# Patient Record
Sex: FEMALE | Race: WHITE | NOT HISPANIC OR LATINO
[De-identification: names, ages, dates, MRNs, and addresses within clinical notes are randomized per-mention and may not be internally consistent; named-entity substitution may affect disease eponyms.]

---

## 2022-11-11 PROBLEM — Z00.00 ENCOUNTER FOR PREVENTIVE HEALTH EXAMINATION: Status: ACTIVE | Noted: 2022-11-11

## 2023-05-12 LAB — TSH SERPL-ACNC: 2.46

## 2023-05-15 ENCOUNTER — APPOINTMENT (OUTPATIENT)
Dept: ENDOCRINOLOGY | Facility: CLINIC | Age: 28
End: 2023-05-15
Payer: COMMERCIAL

## 2023-05-15 VITALS
WEIGHT: 185 LBS | DIASTOLIC BLOOD PRESSURE: 70 MMHG | SYSTOLIC BLOOD PRESSURE: 110 MMHG | HEART RATE: 92 BPM | HEIGHT: 61 IN | OXYGEN SATURATION: 97 % | BODY MASS INDEX: 34.93 KG/M2

## 2023-05-15 DIAGNOSIS — E06.3 AUTOIMMUNE THYROIDITIS: ICD-10-CM

## 2023-05-15 DIAGNOSIS — D68.2 HEREDITARY DEFICIENCY OF OTHER CLOTTING FACTORS: ICD-10-CM

## 2023-05-15 DIAGNOSIS — R42 DIZZINESS AND GIDDINESS: ICD-10-CM

## 2023-05-15 DIAGNOSIS — R56.9 UNSPECIFIED CONVULSIONS: ICD-10-CM

## 2023-05-15 PROCEDURE — 99204 OFFICE O/P NEW MOD 45 MIN: CPT

## 2023-06-13 PROBLEM — D68.2 FACTOR II DEFICIENCY: Status: ACTIVE | Noted: 2023-06-13

## 2023-06-13 RX ORDER — ASPIRIN 81 MG
81 TABLET, DELAYED RELEASE (ENTERIC COATED) ORAL
Refills: 0 | Status: ACTIVE | COMMUNITY

## 2023-06-13 RX ORDER — UBIDECARENONE 50 MG
TABLET ORAL
Refills: 0 | Status: ACTIVE | COMMUNITY

## 2023-06-13 RX ORDER — TOPIRAMATE 50 MG/1
TABLET, FILM COATED ORAL
Refills: 0 | Status: ACTIVE | COMMUNITY

## 2023-06-13 NOTE — ASSESSMENT
[FreeTextEntry1] : HT  not requiring meds yet\par \par pt asing if this could be HT encephalopathy causing seizures  could onlydx whtht with LP would have ot be doen by NEurology - not me\par \par Pt has  other issuesas well- drainiage from ear-- to see NEuro-iglesia next week\par

## 2023-06-13 NOTE — REVIEW OF SYSTEMS
[Fatigue] : fatigue [Decreased Appetite] : decreased appetite [Neck Pain] : neck pain [Nausea] : nausea [Constipation] : constipation [Diarrhea] : diarrhea [Joint Pain] : joint pain [Muscle Weakness] : muscle weakness [Myalgia] : myalgia  [Joint Stiffness] : joint stiffness [Muscle Cramps] : muscle cramps [Back Pain] : back pain [Headaches] : headaches [Dizziness] : dizziness [Confusion] : confusion [Tremors] : tremors [Pain/Numbness of Digits] : pain/numbness of digits [Poor Balance] : poor balance [Depression] : depression [Insomnia] : insomnia [Anxiety] : anxiety [Stress] : stress [Cold Intolerance] : cold intolerance [Negative] : Heme/Lymph [FreeTextEntry5] : occ palp

## 2023-06-13 NOTE — HISTORY OF PRESENT ILLNESS
[FreeTextEntry1] : Pt here for veval of HT \par pt found to have HT 10/22 notrequiring meds \par found on w/u by rheumatology \par \par PMH \par \par FActor 2 def- seeing heamotlogy \par asthma\par \par   Seizure d/o  dx 7/2022   triggered by light and sounds\par \par pt has been found to have fluid leaking fromear  but no cause ofund\par \par tsaw ENT - \par \par saw HA specialist \par \par to see [facility]olist at [facility]  next week \par \par Galucoma \par \par PSH \par  none\par \par \par \par FH \par  Mother Father Sister GF  - obesity\par \par sister and GM  thrytoid trouble \par \par \par SocHX \par born in   raised in [place]\par \par  No exposure to chemor XRT \par \par  no expsoure to chem or XRT \par  no alcohol \par  nosmoking \par

## 2023-06-13 NOTE — PHYSICAL EXAM
[Alert] : alert [Well Nourished] : well nourished [No Acute Distress] : no acute distress [Well Developed] : well developed [Normal Sclera/Conjunctiva] : normal sclera/conjunctiva [EOMI] : extra ocular movement intact [No Proptosis] : no proptosis [Normal Oropharynx] : the oropharynx was normal [Thyroid Not Enlarged] : the thyroid was not enlarged [No Thyroid Nodules] : no palpable thyroid nodules [No Respiratory Distress] : no respiratory distress [Clear to Auscultation] : lungs were clear to auscultation bilaterally [No Accessory Muscle Use] : no accessory muscle use [Normal S1, S2] : normal S1 and S2 [Normal Rate] : heart rate was normal [Regular Rhythm] : with a regular rhythm [No Edema] : no peripheral edema [Pedal Pulses Normal] : the pedal pulses are present [Normal Anterior Cervical Nodes] : no anterior cervical lymphadenopathy [Normal Posterior Cervical Nodes] : no posterior cervical lymphadenopathy [No Stigmata of Cushings Syndrome] : no stigmata of Cushings Syndrome [Normal Gait] : normal gait [Normal Strength/Tone] : muscle strength and tone were normal [No Rash] : no rash [Normal Reflexes] : deep tendon reflexes were 2+ and symmetric [No Tremors] : no tremors [Oriented x3] : oriented to person, place, and time [Acanthosis Nigricans] : no acanthosis nigricans

## 2023-11-22 ENCOUNTER — RX RENEWAL (OUTPATIENT)
Age: 28
End: 2023-11-22

## 2023-11-30 ENCOUNTER — APPOINTMENT (OUTPATIENT)
Dept: ENDOCRINOLOGY | Facility: CLINIC | Age: 28
End: 2023-11-30
Payer: COMMERCIAL

## 2023-11-30 LAB — TSH SERPL-ACNC: 2.24

## 2023-11-30 PROCEDURE — 99213 OFFICE O/P EST LOW 20 MIN: CPT | Mod: 95

## 2023-11-30 RX ORDER — LEVOTHYROXINE SODIUM 0.03 MG/1
25 TABLET ORAL
Qty: 45 | Refills: 0 | Status: DISCONTINUED | COMMUNITY
Start: 2023-08-25 | End: 2023-11-30

## 2023-11-30 RX ORDER — LEVETIRACETAM 1000 MG/1
TABLET, FILM COATED ORAL
Refills: 0 | Status: DISCONTINUED | COMMUNITY
End: 2023-11-30

## 2023-12-26 NOTE — HISTORY OF PRESENT ILLNESS
[FreeTextEntry1] : TEB start ttime 410 PM end time 430 PM  Follow up HT  pt found to have HT 10/22 notrequiring meds  found on w/u by rheumatology   PMH    follo wup HT  FActor 2 def- seeing heamotlogy  asthma    Seizure d/o  dx 7/2022   triggered by light and sounds   pt found still with fluid leaking o from ear   tsaw ENT -   saw HA specialist   to see Neuro[facility]otolaryngolist at [facility]  next week   Galucoma   PSH   none    FH   Mother Father Sister GF  - obesity  sister and GM  thrytoid trouble    SocHX  born in   raised in [place]   No exposure to chemor XRT    no expsoure to chem or XRT   no alcohol   nosmoking

## 2023-12-26 NOTE — REASON FOR VISIT
[Home] : at home, [unfilled] , at the time of the visit. [Medical Office: ([facility])___] : at the medical office located in  [This encounter was initiated by telehealth (audio with video) and converted to telephone (audio only) due to technical difficulties.] : This encounter was initiated by telehealth (audio with video) and converted to telephone (audio only) due to technical difficulties. [Follow - Up] : a follow-up visit

## 2024-01-23 ENCOUNTER — TRANSCRIPTION ENCOUNTER (OUTPATIENT)
Age: 29
End: 2024-01-23

## 2024-04-11 ENCOUNTER — RX ONLY (RX ONLY)
Age: 29
End: 2024-04-11

## 2024-04-11 ENCOUNTER — OFFICE (OUTPATIENT)
Dept: URBAN - METROPOLITAN AREA CLINIC 38 | Facility: CLINIC | Age: 29
Setting detail: OPHTHALMOLOGY
End: 2024-04-11
Payer: MEDICAID

## 2024-04-11 DIAGNOSIS — H50.00: ICD-10-CM

## 2024-04-11 DIAGNOSIS — H40.1130: ICD-10-CM

## 2024-04-11 PROCEDURE — 92004 COMPRE OPH EXAM NEW PT 1/>: CPT | Performed by: OPHTHALMOLOGY

## 2024-04-11 PROCEDURE — 92015 DETERMINE REFRACTIVE STATE: CPT | Performed by: OPHTHALMOLOGY

## 2024-04-11 PROCEDURE — 92133 CPTRZD OPH DX IMG PST SGM ON: CPT | Performed by: OPHTHALMOLOGY

## 2024-06-13 ENCOUNTER — NON-APPOINTMENT (OUTPATIENT)
Age: 29
End: 2024-06-13

## 2024-06-14 ENCOUNTER — APPOINTMENT (OUTPATIENT)
Dept: OBGYN | Facility: CLINIC | Age: 29
End: 2024-06-14
Payer: MEDICAID

## 2024-06-14 VITALS
HEIGHT: 61 IN | SYSTOLIC BLOOD PRESSURE: 116 MMHG | DIASTOLIC BLOOD PRESSURE: 72 MMHG | WEIGHT: 168 LBS | BODY MASS INDEX: 31.72 KG/M2

## 2024-06-14 DIAGNOSIS — Z11.3 ENCOUNTER FOR SCREENING FOR INFECTIONS WITH A PREDOMINANTLY SEXUAL MODE OF TRANSMISSION: ICD-10-CM

## 2024-06-14 DIAGNOSIS — Z78.9 OTHER SPECIFIED HEALTH STATUS: ICD-10-CM

## 2024-06-14 DIAGNOSIS — N92.6 IRREGULAR MENSTRUATION, UNSPECIFIED: ICD-10-CM

## 2024-06-14 DIAGNOSIS — Z83.3 FAMILY HISTORY OF DIABETES MELLITUS: ICD-10-CM

## 2024-06-14 DIAGNOSIS — Q21.12 PATENT FORAMEN OVALE: ICD-10-CM

## 2024-06-14 DIAGNOSIS — Z12.4 ENCOUNTER FOR SCREENING FOR MALIGNANT NEOPLASM OF CERVIX: ICD-10-CM

## 2024-06-14 DIAGNOSIS — R19.00 INTRA-ABDOMINAL AND PELVIC SWELLING, MASS AND LUMP, UNSPECIFIED SITE: ICD-10-CM

## 2024-06-14 DIAGNOSIS — R79.89 OTHER SPECIFIED ABNORMAL FINDINGS OF BLOOD CHEMISTRY: ICD-10-CM

## 2024-06-14 DIAGNOSIS — Z72.3 LACK OF PHYSICAL EXERCISE: ICD-10-CM

## 2024-06-14 DIAGNOSIS — Z01.411 ENCOUNTER FOR GYNECOLOGICAL EXAMINATION (GENERAL) (ROUTINE) WITH ABNORMAL FINDINGS: ICD-10-CM

## 2024-06-14 LAB
HCG UR QL: NEGATIVE
QUALITY CONTROL: YES

## 2024-06-14 PROCEDURE — 99459 PELVIC EXAMINATION: CPT

## 2024-06-14 PROCEDURE — 99385 PREV VISIT NEW AGE 18-39: CPT

## 2024-06-14 PROCEDURE — 81025 URINE PREGNANCY TEST: CPT

## 2024-06-14 PROCEDURE — 99204 OFFICE O/P NEW MOD 45 MIN: CPT | Mod: 25

## 2024-06-14 RX ORDER — PNV NO.95/FERROUS FUM/FOLIC AC 28MG-0.8MG
TABLET ORAL
Refills: 0 | Status: ACTIVE | COMMUNITY

## 2024-06-14 RX ORDER — OMEGA-3/DHA/EPA/FISH OIL 300-1000MG
CAPSULE ORAL
Refills: 0 | Status: ACTIVE | COMMUNITY

## 2024-06-14 RX ORDER — RIMEGEPANT SULFATE 75 MG/75MG
75 TABLET, ORALLY DISINTEGRATING ORAL
Refills: 0 | Status: ACTIVE | COMMUNITY

## 2024-06-14 RX ORDER — BUDESONIDE 90 UG/1
90 AEROSOL, POWDER RESPIRATORY (INHALATION)
Refills: 0 | Status: ACTIVE | COMMUNITY

## 2024-06-14 RX ORDER — ELECTROLYTES/DEXTROSE
SOLUTION, ORAL ORAL
Refills: 0 | Status: ACTIVE | COMMUNITY

## 2024-06-14 RX ORDER — EPINEPHRINE 0.3 MG/.3ML
0.3 INJECTION INTRAMUSCULAR
Refills: 0 | Status: ACTIVE | COMMUNITY

## 2024-06-14 RX ORDER — CHROMIUM 200 MCG
TABLET ORAL
Refills: 0 | Status: ACTIVE | COMMUNITY

## 2024-06-14 RX ORDER — OMEPRAZOLE 20 MG/1
20 CAPSULE, DELAYED RELEASE ORAL
Refills: 0 | Status: ACTIVE | COMMUNITY

## 2024-06-14 RX ORDER — VENLAFAXINE HCL 37.5 MG
37.5 TABLET ORAL
Refills: 0 | Status: ACTIVE | COMMUNITY

## 2024-06-14 RX ORDER — MECLIZINE HYDROCHLORIDE 12.5 MG/1
12.5 TABLET ORAL
Refills: 0 | Status: ACTIVE | COMMUNITY

## 2024-06-14 RX ORDER — LORATADINE 10 MG/1
10 TABLET ORAL
Refills: 0 | Status: ACTIVE | COMMUNITY

## 2024-06-14 RX ORDER — ALBUTEROL SULFATE 90 UG/1
108 (90 BASE) INHALANT RESPIRATORY (INHALATION)
Refills: 0 | Status: ACTIVE | COMMUNITY

## 2024-06-14 RX ORDER — LATANOPROST/PF 0.005 %
0.01 DROPS OPHTHALMIC (EYE)
Refills: 0 | Status: ACTIVE | COMMUNITY

## 2024-06-14 RX ORDER — ASCORBIC ACID 500 MG
TABLET ORAL
Refills: 0 | Status: ACTIVE | COMMUNITY

## 2024-06-14 NOTE — HISTORY OF PRESENT ILLNESS
[No] : Patient does not have concerns regarding sex [N] : Patient denies prior pregnancies [Menarche Age: ____] : age at menarche was [unfilled] [Never active] : never active [TextBox_4] : Muriel is here to establish care. She was recommended to see gyn due to incidentally noted elevated hcg levels at a recent hospital visit.  She presented to the ER on 4/17 for left arm and chest pain. She states her troponins were elevated and she was admitted for observation. During that visit she had labs completed and hcg was noted to be 25, dropping to 23.  (records not available, this is pt's recollectoin).  Pt states she has never been sexually active and pregnancy is not possible.  She has not had levels rechecked.  She states a doctor in the hospital told her to be checked for gyn cancer.  She has a relatively new history of seizures- within the last couple of years.  She states she has the seizures 'when she is sick'.  Since 4/17 she had very frequent seizures and felt something was wrong. She went to the ER 3 more times, and was finally told she needed her gallbladder removed. She had the cholecystectomy on 5/3.  She states her seizures stopped after this.    She also has PMH significant for PFO, Factor 2 (takes asa 81 daily), asthma, glaucoma, and migraines.  From a gyn standpoint- she has regular monthly menses, with recent menses being lighter than usual/sometimes brown spotting only.  She sees a correllation with bilateral axillary discomfort and ovulation.  She has never been sexually active, but states she would be comfortable with a pelvic exam as she has inserted something in the vagina before.  There are no labs or imaging results available, but pt states she had an abdominal CT on 4/17 and was told she had a '1.4cm right ovarian cyst'. She has never had a pelvic US.          [PapSmeardate] : NEVER [ColonoscopyDate] : NEVER [LMPDate] : 05/30/24 [PGHxTotal] : 0 [Regular Cycle Intervals] : periods have been regular [FreeTextEntry1] : 05/30/24

## 2024-06-14 NOTE — PLAN
[FreeTextEntry1] : We discussed her concerns regarding the elevated hcg level and irregular periods.  We will repeat lab work today and a pelvic US was ordered.  If her hcg is elevated we will repeat the level next week.  We discussed referring to gyn onc for an opinion if hcg is elevated to discuss further work up.    She will f/u with me same day as the US to review results.    She tolerated the pelvic exam well.  Self breast exams/awareness encouraged

## 2024-06-14 NOTE — PHYSICAL EXAM
[Chaperone Present] : A chaperone was present in the examining room during all aspects of the physical examination [[id number]] : A chaperone was present during the pelvic exam. [FreeTextEntry2] : ROSMERY Malhotra [Appropriately responsive] : appropriately responsive [Alert] : alert [No Acute Distress] : no acute distress [Soft] : soft [Non-tender] : non-tender [Non-distended] : non-distended [No HSM] : No HSM [No Lesions] : no lesions [No Mass] : no mass [Oriented x3] : oriented x3 [Examination Of The Breasts] : a normal appearance [No Masses] : no breast masses were palpable [Labia Majora] : normal [Labia Minora] : normal [Normal] : normal [Tenderness] : nontender [FreeTextEntry8] : generalized pelvic fullness- may be related to pt being tense.  ovaries not palpable. uterus enlarged??

## 2024-06-19 ENCOUNTER — NON-APPOINTMENT (OUTPATIENT)
Age: 29
End: 2024-06-19

## 2024-06-19 LAB
BASOPHILS # BLD AUTO: 0.09 K/UL
BASOPHILS NFR BLD AUTO: 0.9 %
C TRACH RRNA SPEC QL NAA+PROBE: NOT DETECTED
CYTOLOGY CVX/VAG DOC THIN PREP: NORMAL
EOSINOPHIL # BLD AUTO: 0.31 K/UL
EOSINOPHIL NFR BLD AUTO: 3.1 %
ESTRADIOL SERPL-MCNC: 92 PG/ML
FSH SERPL-MCNC: 4.9 IU/L
HCG SERPL-MCNC: <1 MIU/ML
HCT VFR BLD CALC: 40.8 %
HGB BLD-MCNC: 13.6 G/DL
HPV HIGH+LOW RISK DNA PNL CVX: NOT DETECTED
IMM GRANULOCYTES NFR BLD AUTO: 0.4 %
LH SERPL-ACNC: 6.5 IU/L
LYMPHOCYTES # BLD AUTO: 2.65 K/UL
LYMPHOCYTES NFR BLD AUTO: 26.7 %
MAN DIFF?: NORMAL
MCHC RBC-ENTMCNC: 32.5 PG
MCHC RBC-ENTMCNC: 33.3 GM/DL
MCV RBC AUTO: 97.4 FL
MONOCYTES # BLD AUTO: 0.63 K/UL
MONOCYTES NFR BLD AUTO: 6.3 %
N GONORRHOEA RRNA SPEC QL NAA+PROBE: NOT DETECTED
NEUTROPHILS # BLD AUTO: 6.22 K/UL
NEUTROPHILS NFR BLD AUTO: 62.6 %
PLATELET # BLD AUTO: 344 K/UL
PROLACTIN SERPL-MCNC: 13.1 NG/ML
RBC # BLD: 4.19 M/UL
RBC # FLD: 13.4 %
SOURCE TP AMPLIFICATION: NORMAL
TSH SERPL-ACNC: 6.6 UIU/ML
WBC # FLD AUTO: 9.94 K/UL

## 2024-08-05 ENCOUNTER — APPOINTMENT (OUTPATIENT)
Dept: ANTEPARTUM | Facility: CLINIC | Age: 29
End: 2024-08-05

## 2024-08-05 ENCOUNTER — APPOINTMENT (OUTPATIENT)
Dept: OBGYN | Facility: CLINIC | Age: 29
End: 2024-08-05

## 2024-08-05 ENCOUNTER — ASOB RESULT (OUTPATIENT)
Age: 29
End: 2024-08-05

## 2024-08-05 PROBLEM — R79.89 ELEVATED TSH: Status: ACTIVE | Noted: 2024-08-05

## 2024-08-05 PROBLEM — Z71.2 ENCOUNTER TO DISCUSS TEST RESULTS: Status: ACTIVE | Noted: 2024-08-05

## 2024-08-05 PROCEDURE — 76857 US EXAM PELVIC LIMITED: CPT | Mod: 59

## 2024-08-05 PROCEDURE — 99213 OFFICE O/P EST LOW 20 MIN: CPT

## 2024-08-05 PROCEDURE — 76830 TRANSVAGINAL US NON-OB: CPT

## 2024-08-05 NOTE — PLAN
[FreeTextEntry1] : normal pelvic US  will check thyroid panel today, and pt requesting we send to her pcp Dr. Trimble in [place] who she is scheduled with next week.  keep planned f/u with endocrinology.

## 2024-08-05 NOTE — HISTORY OF PRESENT ILLNESS
[N] : Patient denies prior pregnancies [Menarche Age: ____] : age at menarche was [unfilled] [No] : Patient does not have concerns regarding sex [Never active] : never active [TextBox_4] : Muriel is here for follow up. she had a pelvic US completed due to history of an ovarian cyst. She also had elevated hcg levels despite never being sexually active (incidentally noted while in the hospital). Her hcg is now negative.  Her pelvic US is normal today.  Her TSH was noted to be elevated- she had an endocrinology appointment scheduled for october, but is seeing her PCP next week.   [PapSmeardate] : 06/14/2024 [TextBox_31] : NEGATIVE [ColonoscopyDate] : NEVER [HPVDate] : 06/14/2024 [TextBox_78] : NEGATIVE [LMPDate] : 07/20/24 [PGHxTotal] : 0 [FreeTextEntry1] : 07/20/24

## 2024-10-24 ENCOUNTER — APPOINTMENT (OUTPATIENT)
Dept: ENDOCRINOLOGY | Facility: CLINIC | Age: 29
End: 2024-10-24
Payer: MEDICAID

## 2024-10-24 VITALS
HEIGHT: 61 IN | BODY MASS INDEX: 31.72 KG/M2 | HEART RATE: 57 BPM | WEIGHT: 168 LBS | SYSTOLIC BLOOD PRESSURE: 118 MMHG | OXYGEN SATURATION: 95 % | DIASTOLIC BLOOD PRESSURE: 64 MMHG

## 2024-10-24 DIAGNOSIS — E06.3 AUTOIMMUNE THYROIDITIS: ICD-10-CM

## 2024-10-24 LAB — TSH SERPL-ACNC: 1.99

## 2024-10-24 PROCEDURE — G2211 COMPLEX E/M VISIT ADD ON: CPT | Mod: NC

## 2024-10-24 PROCEDURE — 99214 OFFICE O/P EST MOD 30 MIN: CPT

## 2024-10-24 NOTE — PHYSICAL EXAM
Initial (On Arrival) [Alert] : alert [Obese] : obese [No Acute Distress] : no acute distress [Normal Voice/Communication] : normal voice communication [PERRL] : pupils equal, round and reactive to light [Normal Hearing] : hearing was normal [No Neck Mass] : no neck mass was observed [Thyroid Not Enlarged] : the thyroid was not enlarged [No Respiratory Distress] : no respiratory distress [Clear to Auscultation] : lungs were clear to auscultation bilaterally [Normal Rate] : heart rate was normal [Regular Rhythm] : with a regular rhythm [Oriented x3] : oriented to person, place, and time [Normal Affect] : the affect was normal [Normal Insight/Judgement] : insight and judgment were intact [Normal Mood] : the mood was normal [de-identified] : Light DTR check at right antecubital area provoked ballistic arm movements.

## 2024-10-24 NOTE — HISTORY OF PRESENT ILLNESS
[FreeTextEntry1] : 29 y.o. Female follows for Hashimoto's thyroiditis. Transfers care from Dr. Valdes. Patient has been having movement disorder with unclear diagnosis over the last 2 year. She has been following with different subspecialty of neurology. She was on Naltrexone holding her involuntary movements but recently stopped due to palpitations, which exacerbated her condition.

## 2024-10-24 NOTE — PHYSICAL EXAM
[Alert] : alert [Obese] : obese [No Acute Distress] : no acute distress [Normal Voice/Communication] : normal voice communication [PERRL] : pupils equal, round and reactive to light [Normal Hearing] : hearing was normal [No Neck Mass] : no neck mass was observed [Thyroid Not Enlarged] : the thyroid was not enlarged [No Respiratory Distress] : no respiratory distress [Clear to Auscultation] : lungs were clear to auscultation bilaterally [Normal Rate] : heart rate was normal [Regular Rhythm] : with a regular rhythm [Oriented x3] : oriented to person, place, and time [Normal Affect] : the affect was normal [Normal Insight/Judgement] : insight and judgment were intact [Normal Mood] : the mood was normal [de-identified] : Light DTR check at right antecubital area provoked ballistic arm movements.

## 2024-10-24 NOTE — ASSESSMENT
[FreeTextEntry1] : 29 y.o. Female follows for Hashimoto's thyroiditis. Transfers care from Dr. Valdes. Patient has been having movement disorder with unclear diagnosis over the last 2 year. She has been following with different subspecialty of neurology. She was on Naltrexone holding her involuntary movements but recently stopped due to palpitations, which exacerbated her condition.   # Reportedly Hashimoto's disease Not on medications TFTs have been WNL Clinically and biochemically euthyroid F/u in 6 months.

## 2024-11-15 ENCOUNTER — APPOINTMENT (OUTPATIENT)
Dept: RHEUMATOLOGY | Facility: CLINIC | Age: 29
End: 2024-11-15

## 2024-11-15 VITALS
WEIGHT: 168 LBS | HEART RATE: 109 BPM | BODY MASS INDEX: 31.72 KG/M2 | HEIGHT: 61 IN | DIASTOLIC BLOOD PRESSURE: 77 MMHG | SYSTOLIC BLOOD PRESSURE: 116 MMHG | OXYGEN SATURATION: 100 %

## 2024-11-15 DIAGNOSIS — H81.09 MENIERE'S DISEASE, UNSPECIFIED EAR: ICD-10-CM

## 2024-11-15 DIAGNOSIS — G25.9 EXTRAPYRAMIDAL AND MOVEMENT DISORDER, UNSPECIFIED: ICD-10-CM

## 2024-11-15 DIAGNOSIS — Q79.62 HYPERMOBILE EHLERS-DANLOS SYNDROME: ICD-10-CM

## 2024-11-15 DIAGNOSIS — D68.2 HEREDITARY DEFICIENCY OF OTHER CLOTTING FACTORS: ICD-10-CM

## 2024-11-15 DIAGNOSIS — F44.5 CONVERSION DISORDER WITH SEIZURES OR CONVULSIONS: ICD-10-CM

## 2024-11-15 PROCEDURE — 99205 OFFICE O/P NEW HI 60 MIN: CPT

## 2024-11-15 PROCEDURE — 99417 PROLNG OP E/M EACH 15 MIN: CPT

## 2024-11-16 LAB
CD16+CD56+ CELLS # BLD: 245 CELLS/UL
CD16+CD56+ CELLS NFR BLD: 12 %
CD19 CELLS NFR BLD: 229 CELLS/UL
CD3 CELLS # BLD: 1502 CELLS/UL
CD3 CELLS NFR BLD: 74 %
CD3+CD4+ CELLS # BLD: 884 CELLS/UL
CD3+CD4+ CELLS NFR BLD: 43 %
CD3+CD4+ CELLS/CD3+CD8+ CLL SPEC: 1.68 RATIO
CD3+CD8+ CELLS # SPEC: 526 CELLS/UL
CD3+CD8+ CELLS NFR BLD: 26 %
CELLS.CD3-CD19+/CELLS IN BLOOD: 11 %

## 2024-11-18 LAB
CD19 CELLS # BLD: 275 CELLS/UL
CD19 CELLS NFR BLD: 12.9
CD19+CD27+ CELLS # BLD: 126 CELLS/UL
CD19+CD27+IGD+IGM+ CELLS # BLD: 58 CELLS/UL
CD19+CD27+IGD-IGM- CELLS # BLD: 50 CELLS/UL
CD19+CD38+IGM+ CELLS # BLD: 6 CELLS/UL
CD19+CD38+IGM- CELLS # BLD: 1 CELLS/UL
CD20 CELLS # BLD: 271 CELLS/UL
CD20 CELLS/CELLS.CD19 NFR BLD: 98.4
CD21LOW38- CELLS # BLD: 14 CELLS/UL
CD21LOW38- CELLS/CELLS.CD19 NFR BLD: 5.2
CD27 CELLS/CELLS.CD19 NFR BLD: 45.9
CD27+IGD+IGM+/CD19 CELLS NFR BLD: 20.9
CD27+IGD-IGM-/CD19 CELLS NFR BLD: 18.1
CD38+IGM+ CELLS/CELLS.CD19 NFR BLD: 2.1
CD38+IGM- CELLS/CELLS.CD19 NFR BLD: 0.5
HISTAMINE BLD-MCNC: 1.59 NG/ML
TOTAL IGE SMQN RAST: 18 KU/L
TRYPTASE: 10.1 UG/L

## 2024-11-19 LAB
ALBUMIN MFR SERPL ELPH: 54.9 %
ALBUMIN SERPL-MCNC: 4.1 G/DL
ALBUMIN/GLOB SERPL: 1.2 RATIO
ALPHA1 GLOB MFR SERPL ELPH: 5.4 %
ALPHA1 GLOB SERPL ELPH-MCNC: 0.4 G/DL
ALPHA2 GLOB MFR SERPL ELPH: 10.1 %
ALPHA2 GLOB SERPL ELPH-MCNC: 0.8 G/DL
B-GLOBULIN MFR SERPL ELPH: 8.1 %
B-GLOBULIN SERPL ELPH-MCNC: 0.6 G/DL
DEPRECATED KAPPA LC FREE/LAMBDA SER: 1.35 RATIO
GAMMA GLOB FLD ELPH-MCNC: 1.6 G/DL
GAMMA GLOB MFR SERPL ELPH: 21.5 %
IGA SER QL IEP: <2 MG/DL
IGG SER QL IEP: 1681 MG/DL
IGM SER QL IEP: 132 MG/DL
INTERPRETATION SERPL IEP-IMP: NORMAL
KAPPA LC CSF-MCNC: 1.51 MG/DL
KAPPA LC SERPL-MCNC: 2.04 MG/DL
M PROTEIN SPEC IFE-MCNC: NORMAL
PROT SERPL-MCNC: 7.5 G/DL
PROT SERPL-MCNC: 7.5 G/DL

## 2024-11-23 ENCOUNTER — OFFICE (OUTPATIENT)
Dept: URBAN - METROPOLITAN AREA CLINIC 38 | Facility: CLINIC | Age: 29
Setting detail: OPHTHALMOLOGY
End: 2024-11-23
Payer: COMMERCIAL

## 2024-11-23 DIAGNOSIS — H40.1130: ICD-10-CM

## 2024-11-23 PROCEDURE — 76514 ECHO EXAM OF EYE THICKNESS: CPT

## 2024-11-23 PROCEDURE — 92133 CPTRZD OPH DX IMG PST SGM ON: CPT

## 2024-11-23 PROCEDURE — 99213 OFFICE O/P EST LOW 20 MIN: CPT

## 2024-11-23 ASSESSMENT — REFRACTION_MANIFEST
OS_CYLINDER: -1.75
OD_SPHERE: -4.75
OS_AXIS: 175
OD_SPHERE: -4.75
OD_VA2: 20/20(J1+)
OS_SPHERE: -4.00
OD_VA1: 20/20-1
OD_VA2: 20/20(J1+)
OS_VA1: 20/20
OD_AXIS: 015
OU_VA: 20/20
OD_VA1: 20/20-1
OS_VA2: 20/20(J1+)
OD_CYLINDER: -1.25
OS_AXIS: 175
OS_VA1: 20/20
OD_AXIS: 015
OS_SPHERE: -4.25
OD_CYLINDER: -1.25
OU_VA: 20/20
OS_CYLINDER: -2.00
OS_VA2: 20/20(J1+)

## 2024-11-23 ASSESSMENT — KERATOMETRY
OD_K1POWER_DIOPTERS: 45.00
OD_K2POWER_DIOPTERS: 46.75
OS_K2POWER_DIOPTERS: 47.25
OD_AXISANGLE_DEGREES: 095
OS_AXISANGLE_DEGREES: 087
OS_K1POWER_DIOPTERS: 44.50

## 2024-11-23 ASSESSMENT — CONFRONTATIONAL VISUAL FIELD TEST (CVF)
OS_FINDINGS: FULL
OD_FINDINGS: FULL

## 2024-11-23 ASSESSMENT — VISUAL ACUITY
OS_BCVA: 20/20-2
OD_BCVA: 20/25+3

## 2024-11-23 ASSESSMENT — PACHYMETRY
OD_CT_UM: 552
OS_CT_CORRECTION: -1
OS_CT_UM: 559
OD_CT_CORRECTION: -1

## 2024-11-23 ASSESSMENT — REFRACTION_AUTOREFRACTION
OD_SPHERE: -4.50
OS_SPHERE: -3.75
OD_AXIS: 012
OS_AXIS: 173
OS_CYLINDER: -2.25
OD_CYLINDER: -1.25

## 2024-11-23 ASSESSMENT — REFRACTION_CURRENTRX
OS_CYLINDER: -1.75
OD_SPHERE: -4.75
OS_VPRISM_DIRECTION: SV
OD_CYLINDER: -1.25
OS_OVR_VA: 20/
OD_VPRISM_DIRECTION: SV
OD_AXIS: 025
OD_OVR_VA: 20/
OS_AXIS: 177
OS_SPHERE: -4.25

## 2025-03-04 ENCOUNTER — APPOINTMENT (OUTPATIENT)
Dept: RHEUMATOLOGY | Facility: CLINIC | Age: 30
End: 2025-03-04
Payer: MEDICAID

## 2025-03-04 DIAGNOSIS — D72.9 DISORDER OF WHITE BLOOD CELLS, UNSPECIFIED: ICD-10-CM

## 2025-03-04 DIAGNOSIS — F44.5 CONVERSION DISORDER WITH SEIZURES OR CONVULSIONS: ICD-10-CM

## 2025-03-04 DIAGNOSIS — D80.2 SELECTIVE DEFICIENCY OF IMMUNOGLOBULIN A [IGA]: ICD-10-CM

## 2025-03-04 DIAGNOSIS — Q79.62 HYPERMOBILE EHLERS-DANLOS SYNDROME: ICD-10-CM

## 2025-03-04 PROCEDURE — 99215 OFFICE O/P EST HI 40 MIN: CPT | Mod: 95

## 2025-03-05 PROBLEM — D72.9: Status: ACTIVE | Noted: 2025-03-05

## 2025-03-05 PROBLEM — D80.2 IGA DEFICIENCY: Status: ACTIVE | Noted: 2025-03-05

## 2025-05-19 ENCOUNTER — NON-APPOINTMENT (OUTPATIENT)
Age: 30
End: 2025-05-19

## 2025-05-21 ENCOUNTER — APPOINTMENT (OUTPATIENT)
Dept: NEUROLOGY | Facility: CLINIC | Age: 30
End: 2025-05-21
Payer: MEDICAID

## 2025-05-21 VITALS
TEMPERATURE: 96.3 F | SYSTOLIC BLOOD PRESSURE: 139 MMHG | DIASTOLIC BLOOD PRESSURE: 88 MMHG | BODY MASS INDEX: 32.47 KG/M2 | HEIGHT: 61 IN | HEART RATE: 98 BPM | WEIGHT: 172 LBS | OXYGEN SATURATION: 100 %

## 2025-05-21 DIAGNOSIS — Q79.62 HYPERMOBILE EHLERS-DANLOS SYNDROME: ICD-10-CM

## 2025-05-21 DIAGNOSIS — F44.4 CONVERSION DISORDER WITH MOTOR SYMPTOM OR DEFICIT: ICD-10-CM

## 2025-05-21 PROCEDURE — 99205 OFFICE O/P NEW HI 60 MIN: CPT

## 2025-05-21 PROCEDURE — 99417 PROLNG OP E/M EACH 15 MIN: CPT

## 2025-05-21 PROCEDURE — G2211 COMPLEX E/M VISIT ADD ON: CPT | Mod: NC

## 2025-05-21 NOTE — HISTORY OF PRESENT ILLNESS
[FreeTextEntry1] : Subjective: - Summary : 29-year-old woman referred for neurological complaints including brain fog, subjective weakness, and seizure-like episodes. History of Tabitha-Danlos syndrome, joint hypermobility, chronic pain, and non-epileptic seizures. Recent onset of continuous 'babbling' since February. - Chief Complaint (CC) : Seizure-like episodes, involuntary movements, and continuous 'babbling' - History of Present Illness (HPI) : Patient reports a complex history of neurological symptoms starting in 2022 with head drops, progressing to convulsive-like seizures. Symptoms initially occurred only when sick, but have become continuous since February after an illness. Patient also reports chronic migraines, occurring every other day, primarily affecting the left forehead and under the eye. Migraines sometimes accompanied by visual aura and numbness. Patient has been treated with various medications including low-dose naltrexone, Effexor, and topiramate, with varying effects and side effects. - Past Medical History : Tabitha-Danlos syndrome, joint hypermobility, chronic pain, glaucoma, chronic constipation, irregular menses, fatigue, chronic dizziness, non-epileptic seizures - Past Surgical History : Gallbladder removal - Family History : Not provided - Social History : Recently lost job due to seizures, unable to drive - Review of Systems : Positive for migraines, joint hypermobility, skin complaints, pelvic floor issues, fatigue, dizziness, constipation, musculoskeletal pain - Medications : Nurtec for migraines, topiramate 100mg twice daily, magnesium, eye drops for glaucoma - Allergies : Not specified Objective: - Diagnostic Results : MRI brain (June 2023) normal. Head CT normal. Previous reports mention cavernous angioma, not seen in recent imaging. Lumbar puncture showed no inflammation. EEGs have been normal. Various blood tests with positive results for Lyme, HIV, Jeremias-Barr, and high STEPHANE, interpreted as likely false positives. - Vital Signs : Not provided - Physical Examination (PE) : Observed involuntary movements and vocalizations consistent with functional neurological disorder. Hypermobility noted. 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 Chronic migraines are also present. - Problems : - Functional Neurological Disorder  - Chronic Migraines  - Possible Connective Tissue Disorder (Tabitha-Danlos syndrome)  - Glaucoma  - Differential Diagnosis : - Functional Neurological Disorder  - Tabitha-Danlos Syndrome  - Chronic Migraine   Plan: - Summary : Focus treatment on addressing Functional Neurological Disorder while managing chronic migraines and investigating possible connective tissue disorder. - Plan : - Refer to specialist in Functional Neurological Disorders for comprehensive management  - Continue Nurtec for migraine management, consider adding riboflavin 400mg daily  - Refer to  for evaluation of possible connective tissue disorder  - Gradual tapering of topiramate if discontinuation is desired (25mg reduction every 2 weeks)  - Encourage psychological support and therapy for FND management  - Consider rehabilitation therapies for FND  - Regular follow-up to monitor progress and adjust treatment as needed  - Patient education on FND and its management  - Maintain current glaucoma management

## 2025-05-22 ENCOUNTER — APPOINTMENT (OUTPATIENT)
Dept: ENDOCRINOLOGY | Facility: CLINIC | Age: 30
End: 2025-05-22
Payer: MEDICAID

## 2025-05-22 VITALS
SYSTOLIC BLOOD PRESSURE: 124 MMHG | DIASTOLIC BLOOD PRESSURE: 70 MMHG | HEART RATE: 96 BPM | HEIGHT: 61 IN | OXYGEN SATURATION: 95 % | BODY MASS INDEX: 33.61 KG/M2 | WEIGHT: 178 LBS

## 2025-05-22 DIAGNOSIS — E06.3 AUTOIMMUNE THYROIDITIS: ICD-10-CM

## 2025-05-22 DIAGNOSIS — Z00.00 ENCOUNTER FOR GENERAL ADULT MEDICAL EXAMINATION W/OUT ABNORMAL FINDINGS: ICD-10-CM

## 2025-05-22 PROCEDURE — G2211 COMPLEX E/M VISIT ADD ON: CPT | Mod: NC

## 2025-05-22 PROCEDURE — 99214 OFFICE O/P EST MOD 30 MIN: CPT

## 2025-05-22 NOTE — PHYSICAL EXAM
[Alert] : alert [Obese] : obese [No Acute Distress] : no acute distress [Normal Voice/Communication] : normal voice communication [PERRL] : pupils equal, round and reactive to light [Normal Hearing] : hearing was normal [No Neck Mass] : no neck mass was observed [Thyroid Not Enlarged] : the thyroid was not enlarged [No Respiratory Distress] : no respiratory distress [Clear to Auscultation] : lungs were clear to auscultation bilaterally [Normal Rate] : heart rate was normal [Regular Rhythm] : with a regular rhythm [Oriented x3] : oriented to person, place, and time [Normal Affect] : the affect was normal [Normal Insight/Judgement] : insight and judgment were intact [Normal Mood] : the mood was normal [Soft] : abdomen soft [de-identified] : Light DTR check at right antecubital area provoked ballistic arm movements.

## 2025-05-22 NOTE — ASSESSMENT
[FreeTextEntry1] : 29 y.o. Female follows for Hashimoto's thyroiditis. Former patient of Dr. Valdes. Patient has been having movement disorder with unclear diagnosis over the last 2 year. She has been following with different subspecialty of neurology. Was on Naltrexone holding her involuntary movements but stopped due to palpitations.   Patient here for follow up. Accompanied by her mother. Denies acute issues. Feels OK.   # Reportedly Hashimoto's disease Not on medications Clinically euthyroid No recent TFTs. Will do them in the next few days. Patient also noted mildly elevated BG on two of her blood tests, asking for evaluation for DM. Will obtain BW now If normal f/u in 6 months.

## 2025-05-22 NOTE — HISTORY OF PRESENT ILLNESS
[FreeTextEntry1] : 29 y.o. Female follows for Hashimoto's thyroiditis. Former patient of Dr. Valdes. Patient has been having movement disorder with unclear diagnosis over the last 2 year. She has been following with different subspecialty of neurology. Was on Naltrexone holding her involuntary movements but stopped due to palpitations.